# Patient Record
Sex: FEMALE | Race: BLACK OR AFRICAN AMERICAN | NOT HISPANIC OR LATINO | Employment: FULL TIME | URBAN - METROPOLITAN AREA
[De-identification: names, ages, dates, MRNs, and addresses within clinical notes are randomized per-mention and may not be internally consistent; named-entity substitution may affect disease eponyms.]

---

## 2017-04-11 DIAGNOSIS — I10 HYPERTENSION, BENIGN: ICD-10-CM

## 2017-04-11 RX ORDER — TRIAMTERENE AND HYDROCHLOROTHIAZIDE 37.5; 25 MG/1; MG/1
1 CAPSULE ORAL EVERY MORNING
Qty: 30 CAPSULE | Refills: 0 | Status: SHIPPED | OUTPATIENT
Start: 2017-04-11 | End: 2017-07-24 | Stop reason: SDUPTHER

## 2017-04-11 RX ORDER — AMLODIPINE BESYLATE 10 MG/1
10 TABLET ORAL DAILY
Qty: 30 TABLET | Refills: 0 | Status: SHIPPED | OUTPATIENT
Start: 2017-04-11 | End: 2017-07-24 | Stop reason: SDUPTHER

## 2017-07-24 DIAGNOSIS — I10 HYPERTENSION, BENIGN: ICD-10-CM

## 2017-07-24 RX ORDER — TRIAMTERENE AND HYDROCHLOROTHIAZIDE 37.5; 25 MG/1; MG/1
1 CAPSULE ORAL DAILY
Qty: 15 CAPSULE | Refills: 0 | Status: SHIPPED | OUTPATIENT
Start: 2017-07-24

## 2017-07-24 RX ORDER — AMLODIPINE BESYLATE 10 MG/1
10 TABLET ORAL DAILY
Qty: 15 TABLET | Refills: 0 | Status: SHIPPED | OUTPATIENT
Start: 2017-07-24

## 2017-10-13 ENCOUNTER — PATIENT OUTREACH (OUTPATIENT)
Dept: ADMINISTRATIVE | Facility: HOSPITAL | Age: 62
End: 2017-10-13

## 2017-10-13 NOTE — PROGRESS NOTES
Sent a letter per mail for patient to call back and schedule appointment for labs, office visit (schedule with new provider Dr. Prakash retired), and update health maintenance.

## 2017-10-25 DIAGNOSIS — Z12.11 ENCOUNTER FOR SCREENING FOR MALIGNANT NEOPLASM OF COLON: Primary | ICD-10-CM

## 2018-04-17 ENCOUNTER — HOSPITAL ENCOUNTER (EMERGENCY)
Facility: HOSPITAL | Age: 63
Discharge: HOME | End: 2018-04-17
Attending: EMERGENCY MEDICINE
Payer: COMMERCIAL

## 2018-04-17 VITALS
RESPIRATION RATE: 18 BRPM | TEMPERATURE: 98 F | WEIGHT: 180 LBS | SYSTOLIC BLOOD PRESSURE: 142 MMHG | OXYGEN SATURATION: 98 % | HEIGHT: 63 IN | DIASTOLIC BLOOD PRESSURE: 68 MMHG | BODY MASS INDEX: 31.89 KG/M2 | HEART RATE: 74 BPM

## 2018-04-17 DIAGNOSIS — S52.122A CLOSED DISPLACED FRACTURE OF HEAD OF LEFT RADIUS, INITIAL ENCOUNTER: Primary | ICD-10-CM
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
## 2018-04-17 PROCEDURE — 25000003 PHARM REV CODE 250: Performed by: PHYSICIAN ASSISTANT

## 2018-04-17 PROCEDURE — 99283 EMERGENCY DEPT VISIT LOW MDM: CPT | Mod: 25

## 2018-04-17 PROCEDURE — 29105 APPLICATION LONG ARM SPLINT: CPT | Mod: LT

## 2018-04-17 RX ORDER — HYDROCODONE BITARTRATE AND ACETAMINOPHEN 5; 325 MG/1; MG/1
1 TABLET ORAL
Status: COMPLETED | OUTPATIENT
Start: 2018-04-17 | End: 2018-04-17

## 2018-04-17 RX ORDER — HYDROCODONE BITARTRATE AND ACETAMINOPHEN 5; 325 MG/1; MG/1
1 TABLET ORAL EVERY 4 HOURS PRN
Qty: 10 TABLET | Refills: 0 | Status: SHIPPED | OUTPATIENT
Start: 2018-04-17

## 2018-04-17 RX ADMIN — HYDROCODONE BITARTRATE AND ACETAMINOPHEN 1 TABLET: 5; 325 TABLET ORAL at 01:04

## 2018-04-17 NOTE — ED PROVIDER NOTES
"Encounter Date: 2018    SCRIBE #1 NOTE: I, Bonita Doshi, am scribing for, and in the presence of,  Verónica Springer PA-C. I have scribed the following portions of the note - Other sections scribed: HPI and ROS.       History     Chief Complaint   Patient presents with    Arm Pain     Pt c/o left arm pain dut to trip and fall after chasing her dog 3 hrs ago. Pt denies hitting head or LOC. Took ibuprofen without relief     CC: Arm Pain    HPI: The pt is a 62 y.o. F who presents to the ED c/o pain that radiates from L shoulder to L wrist starting after pt experienced a fall last night without head trauma or LOC. Pt states that she was chasing her dog when she tripped and fell onto her L side. Pt rates pain as severe (10/10) and describes pain as feeling like "spasms." Pt reports taking ibuprofen for pain but that made her feel nauseous. Pt otherwise denies SOB, chest pain, abdominal pain, and other associated symptoms.    Pmhx includes HTN, osteoarthritis of knee, DJD, diverticulosis, and PUD.      The history is provided by the patient. No  was used.     Review of patient's allergies indicates:  No Known Allergies  Past Medical History:   Diagnosis Date    Diverticulosis, sigmoid     DJD (degenerative joint disease), lumbar     H pylori ulcer     History of constipation     Hypertension     Osteoarthritis of knee     PUD (peptic ulcer disease)     RLS (restless legs syndrome)      Past Surgical History:   Procedure Laterality Date     SECTION      CHOLECYSTECTOMY      TUBAL LIGATION      ulcer surgery       Family History   Problem Relation Age of Onset    Cancer Mother     Colon cancer Mother     Hypertension Sister     Diabetes Brother     Hypertension Brother     Breast cancer Neg Hx     Ovarian cancer Neg Hx      Social History   Substance Use Topics    Smoking status: Current Every Day Smoker     Packs/day: 0.50     Years: 40.00     Types: Cigarettes    Smokeless " tobacco: Never Used    Alcohol use No     Review of Systems   Constitutional: Negative for chills, diaphoresis and fever.   HENT: Negative for ear pain and sore throat.    Eyes: Negative for visual disturbance.   Respiratory: Negative for cough and shortness of breath.    Cardiovascular: Negative for chest pain.   Gastrointestinal: Negative for abdominal pain, diarrhea, nausea and vomiting.   Genitourinary: Negative for dysuria and vaginal bleeding.   Musculoskeletal: Positive for arthralgias (10/10, L shoulder, L wrist). Negative for back pain and neck pain.        (+) L arm pain (8/10)   Skin: Negative for color change, pallor, rash and wound.   Neurological: Negative for syncope and headaches.        (-) head trauma   Psychiatric/Behavioral: Negative for confusion.       Physical Exam     Initial Vitals [04/17/18 0022]   BP Pulse Resp Temp SpO2   (!) 153/77 81 16 98 °F (36.7 °C) 97 %      MAP       102.33         Physical Exam    Nursing note and vitals reviewed.  Constitutional: Vital signs are normal. She appears well-developed and well-nourished. She is not diaphoretic. She is cooperative.  Non-toxic appearance. She does not have a sickly appearance. She does not appear ill. No distress.   HENT:   Head: Normocephalic and atraumatic.   Right Ear: Tympanic membrane, external ear and ear canal normal.   Left Ear: Tympanic membrane, external ear and ear canal normal.   Nose: Nose normal.   Mouth/Throat: Uvula is midline, oropharynx is clear and moist and mucous membranes are normal. No oropharyngeal exudate.   Eyes: Conjunctivae, EOM and lids are normal. Pupils are equal, round, and reactive to light.   Neck: Trachea normal, normal range of motion, full passive range of motion without pain and phonation normal. Neck supple.   Cardiovascular: Normal rate, regular rhythm, normal heart sounds and intact distal pulses. Exam reveals no gallop and no friction rub.    No murmur heard.  Pulses:       Radial pulses are 2+  on the right side, and 2+ on the left side.   Capillary refill < 2 seconds of bilateral upper extremities.   Pulmonary/Chest: Effort normal and breath sounds normal. No respiratory distress. She has no decreased breath sounds. She has no wheezes. She has no rhonchi. She has no rales.   Abdominal: Soft. Normal appearance and bowel sounds are normal. She exhibits no distension and no mass. There is no tenderness. There is no rigidity, no rebound and no guarding.   Musculoskeletal: She exhibits tenderness. She exhibits no edema.        Right shoulder: Normal.        Left shoulder: Normal.        Right elbow: Normal.       Left elbow: She exhibits decreased range of motion. She exhibits no swelling, no effusion, no deformity and no laceration. Tenderness found. Radial head tenderness noted. No medial epicondyle, no lateral epicondyle and no olecranon process tenderness noted.        Right wrist: Normal.        Left wrist: Normal.        Cervical back: Normal.        Thoracic back: Normal.        Lumbar back: Normal.        Right upper arm: Normal.        Left upper arm: Normal.        Right forearm: Normal.        Left forearm: Normal.        Right hand: Normal.        Left hand: Normal.   Decreased ROM of left elbow secondary to pain.. There is point tenderness over the left elbow. There is no obvious deformity, effusion, swelling or laceration. Compartments soft. Sensation and strength intact. +2 radial pulses. Capillary refill intact.    Neurological: She is alert and oriented to person, place, and time. She has normal strength. No cranial nerve deficit or sensory deficit. She exhibits normal muscle tone. Coordination and gait normal. GCS eye subscore is 4. GCS verbal subscore is 5. GCS motor subscore is 6.   Skin: Skin is warm and dry. Capillary refill takes less than 2 seconds. No rash noted.   Psychiatric: She has a normal mood and affect. Her speech is normal and behavior is normal. Judgment and thought content  normal. Cognition and memory are normal.         ED Course   Procedures  Labs Reviewed - No data to display          Medical Decision Making:   Initial Assessment:   This is an evaluation of a 62 y.o. female with osteoarthritis and DJD that presents to the Emergency Department for arm pain. Patient reports left arm pain after a fall while chasing her dog this evening. She denies LOC or head injury. She reports severe sharp pain that is worse with movement of the arm. She reports attempted tx with Ibuprofen without relief. She denies any further symptoms.     ED Management:  Physical Exam shows a non-toxic, afebrile, and well appearing female. Normocephalic and atraumatic. Decreased ROM of left elbow secondary to pain.. There is point tenderness over the left elbow. There is no obvious deformity, effusion, swelling or laceration. Compartments soft. Sensation and strength intact. +2 radial pulses. Capillary refill intact. Patient ambulates without assistance. No further injuries.     Vital Signs Are Reassuring. If available, previous records reviewed.   RESULTS: Xray left shoulder negative for acute bony abnormality of the left shoulder or left humerus.   Xray left elbow shows minimally displaced radial head fracture with associated large elbow joint effusion    My overall impression is closed displaced left radial head fracture.  Ddx: fracture, dislocation, effusion, sprain, strain, compartment syndrome    ED Course: [place] for pain control. Sugar tong splint applied to left arm. Splint instructions given. I believe this patient is stable for discharge. D/C Meds: Norco (drowsiness precautions given) Additional D/C Information: I will recommend RICE therapy and close follow-up with orthopedics. The diagnosis, treatment plan, instructions for follow-up and reevaluation with orthopedics, as well as ED return precautions were discussed and understanding was verbalized. All questions or concerns have been addressed.  Patient was discharged home with an instructional sheet which gave not only information regarding the most likely diagnoses but also information regarding when to return to the emergency department for alarming symptoms and when to seek further care.          Other:   I have discussed this case with another health care provider.       <> Summary of the Discussion: This case was discussed with Dr. Maurice who is in agreement with my assessment and plan.   Verónica Springer PA-C              Scribe Attestation:   Scribe #1: I performed the above scribed service and the documentation accurately describes the services I performed. I attest to the accuracy of the note.    Attending Attestation:           Physician Attestation for Scribe:  Physician Attestation Statement for Scribe #1: I, Verónica Springer PA-C, reviewed documentation, as scribed by Bonita Doshi in my presence, and it is both accurate and complete.                    Clinical Impression:   The primary encounter diagnosis was Closed displaced fracture of head of left radius, initial encounter. A diagnosis of Fall was also pertinent to this visit.    Disposition:   Disposition: Discharged  Condition: Stable                        Verónica Springer PA-C  04/19/18 0841

## 2018-04-17 NOTE — ED TRIAGE NOTES
Patient arrived to ED with c/o left arm and shoulder pain x 3 hours secondary to tripping over her dog at home.  Denies neck or back pain.  No obvious deformity noted.  Pulses and cap refill good distal to injury.

## 2018-04-17 NOTE — DISCHARGE INSTRUCTIONS
Your xray shows a fracture of the radial head (one of the two forearm bones near your elbow). You will need to follow-up with orthopedics in clinic as soon as possible. Do not get your splint wet.    You have been prescribed Norco for pain. Please do not take this medication while working, drinking alcohol, swimming, or while driving/operating heavy machinery. This medication may cause drowsiness, impair judgment, and reduce physical capabilities. This medication contains Tylenol. Please do not take any additional Tylenol while you are taking this medication.     Return to the ER for any emergencies

## 2020-01-11 ENCOUNTER — HOSPITAL ENCOUNTER (EMERGENCY)
Facility: HOSPITAL | Age: 65
Discharge: HOME | End: 2020-01-12
Attending: EMERGENCY MEDICINE

## 2020-01-11 DIAGNOSIS — S16.1XXA STRAIN OF NECK MUSCLE, INITIAL ENCOUNTER: Primary | ICD-10-CM

## 2020-01-11 PROCEDURE — 99283 EMERGENCY DEPT VISIT LOW MDM: CPT

## 2020-01-12 VITALS
OXYGEN SATURATION: 97 % | DIASTOLIC BLOOD PRESSURE: 69 MMHG | WEIGHT: 185 LBS | RESPIRATION RATE: 18 BRPM | SYSTOLIC BLOOD PRESSURE: 143 MMHG | TEMPERATURE: 99 F | HEART RATE: 87 BPM | BODY MASS INDEX: 32.78 KG/M2 | HEIGHT: 63 IN

## 2020-01-12 PROCEDURE — 25000003 PHARM REV CODE 250: Performed by: PHYSICIAN ASSISTANT

## 2020-01-12 RX ORDER — DIAZEPAM 2 MG/1
2 TABLET ORAL EVERY 12 HOURS PRN
Qty: 12 TABLET | Refills: 0 | Status: SHIPPED | OUTPATIENT
Start: 2020-01-12 | End: 2020-02-11

## 2020-01-12 RX ORDER — DIAZEPAM 2 MG/1
2 TABLET ORAL
Status: COMPLETED | OUTPATIENT
Start: 2020-01-12 | End: 2020-01-12

## 2020-01-12 RX ADMIN — DIAZEPAM 2 MG: 2 TABLET ORAL at 12:01

## 2020-01-12 NOTE — DISCHARGE INSTRUCTIONS
Use medication as prescribed. Be aware, this medication is sedating.  Do not mix with alcohol or any other sedating medications.  Do not drive or operate machinery when taking this medication.     Please follow-up with primary care provider early next week for re-evaluation.  Return to this ED if symptoms worsen despite treatment, if you begin with any chest pain or trouble breathing, if you begin with any shooting pain down your arm, if you begin with any arm weakness, if you begin with fever, if any other problems occur.

## 2020-01-12 NOTE — ED PROVIDER NOTES
"Encounter Date: 2020       History     Chief Complaint   Patient presents with    Back Pain     pt reports pain from the posterior neck that radiates to upper center and upper left back ongoing since waking up 20; pt reports that it feels like a "crick" in her neck; pt denies any known injuries but reports that she "slept bad" Tuesday night; pt reports using icy hot, warm compress, & ice packs with no relief;    Neck Pain     65yo F with diverticulosis, DJD, hypertension, osteoarthritis, PUD, RLS, with chief complaint left-sided neck pain and stiffness x2 days.  No trauma. Patient says she has strained her neck like this in the past, however not as severe.  Denies any heavy lifting or any trauma. She states she may have slept wrong.  No radiculopathy or paresthesia.  No upper extremity weakness. No chest pain or shortness of breath. No abdominal pain. No fever.  No cough.  No recent illness or sick contacts.  No recent hospitalization.  Symptoms are acute, constant, severity 8/10.  There is very mild relief with gentle massage.  There is exacerbation with attempted range of motion of the neck, with with palpation of the cervical paraspinal and upper trapezius muscular.        Review of patient's allergies indicates:  No Known Allergies  Past Medical History:   Diagnosis Date    Diverticulosis, sigmoid     DJD (degenerative joint disease), lumbar     H pylori ulcer     History of constipation     Hypertension     Osteoarthritis of knee     PUD (peptic ulcer disease)     RLS (restless legs syndrome)      Past Surgical History:   Procedure Laterality Date     SECTION      CHOLECYSTECTOMY      TUBAL LIGATION      ulcer surgery       Family History   Problem Relation Age of Onset    Cancer Mother     Colon cancer Mother     Hypertension Sister     Diabetes Brother     Hypertension Brother     Breast cancer Neg Hx     Ovarian cancer Neg Hx      Social History     Tobacco " Use    Smoking status: Current Every Day Smoker     Packs/day: 0.50     Years: 40.00     Pack years: 20.00     Types: Cigarettes    Smokeless tobacco: Never Used   Substance Use Topics    Alcohol use: No    Drug use: No     Review of Systems   Constitutional: Negative for activity change, appetite change, chills and fever.   HENT: Negative for congestion, ear discharge, ear pain and rhinorrhea.    Respiratory: Negative for cough and chest tightness.    Cardiovascular: Negative for chest pain, palpitations and leg swelling.   Gastrointestinal: Negative for abdominal pain, nausea and vomiting.   Genitourinary: Negative for flank pain.   Musculoskeletal: Positive for neck pain and neck stiffness. Negative for arthralgias.   Skin: Negative for rash and wound.   Neurological: Negative for dizziness, weakness, light-headedness, numbness and headaches.       Physical Exam     Initial Vitals [01/11/20 2358]   BP Pulse Resp Temp SpO2   (!) 154/72 80 18 98.9 °F (37.2 °C) 98 %      MAP       --         Physical Exam    Nursing note and vitals reviewed.  Constitutional: She appears well-developed and well-nourished. She is not diaphoretic. No distress.   Uncomfortable appearing, but nontoxic.   HENT:   Head: Normocephalic and atraumatic.   Eyes: EOM are normal. Pupils are equal, round, and reactive to light.   Neck: Neck supple.   There is exquisite tenderness to the left cervical paraspinal musculature, to the left upper posterior trapezius musculature.  There is pain with attempted range of motion of the neck, limited range of motion secondary to discomfort.  There is no nuchal rigidity.  There is no midline spinal tenderness. There is no cervical lymphadenopathy.   Cardiovascular: Intact distal pulses.   1+ radial bilaterally   Pulmonary/Chest: No respiratory distress.   Lymphadenopathy:     She has no cervical adenopathy.   Neurological: She is alert and oriented to person, place, and time. She has normal strength. GCS  score is 15. GCS eye subscore is 4. GCS verbal subscore is 5. GCS motor subscore is 6.   Grossly symmetric cervical sensory and motor exam.   Skin: Skin is warm and dry. Capillary refill takes less than 2 seconds. No erythema.   Psychiatric: She has a normal mood and affect. Her behavior is normal. Judgment and thought content normal.         ED Course   Procedures  Labs Reviewed - No data to display       Imaging Results    None          Medical Decision Making:   Differential Diagnosis:   Fracture, contusion, sprain/strain, arthritis  ED Management:  On re-evaluation, pain has improved.    No radiculopathy or paresthesia with neck range of motion. No midline spinal tenderness. Good distal pulses. Normal upper extremity sensory and motor exam. No trauma. She does state that she has had a neck strain of this nature in the past.  There is no chest pain or trouble breathing.  Lungs are clear.  Heart is regular rate and rhythm.  Vitals are reassuring.  The acting safely treat this as a cervical/neck strain with Valium p.o. b.i.d. p.r.n..  Have given very strict return precautions.                                 Clinical Impression:       ICD-10-CM ICD-9-CM   1. Strain of neck muscle, initial encounter S16.1XXA 847.0         Disposition:   Disposition: Discharged  Condition: Stable                     Cachorro Mckeon PA-C  01/12/20 0649

## 2020-05-27 ENCOUNTER — LAB VISIT (OUTPATIENT)
Dept: PRIMARY CARE CLINIC | Facility: CLINIC | Age: 65
End: 2020-05-27
Payer: MEDICARE

## 2020-05-27 DIAGNOSIS — R05.9 COUGH: Primary | ICD-10-CM

## 2020-05-27 PROCEDURE — U0003 INFECTIOUS AGENT DETECTION BY NUCLEIC ACID (DNA OR RNA); SEVERE ACUTE RESPIRATORY SYNDROME CORONAVIRUS 2 (SARS-COV-2) (CORONAVIRUS DISEASE [COVID-19]), AMPLIFIED PROBE TECHNIQUE, MAKING USE OF HIGH THROUGHPUT TECHNOLOGIES AS DESCRIBED BY CMS-2020-01-R: HCPCS

## 2020-05-27 NOTE — PROGRESS NOTES
Nasal swab collected     DISPLAY PLAN FREE TEXT no back pain, no gout, no musculoskeletal pain, no neck pain, and no weakness.

## 2020-05-29 ENCOUNTER — TELEPHONE (OUTPATIENT)
Dept: EMERGENCY MEDICINE | Facility: HOSPITAL | Age: 65
End: 2020-05-29

## 2020-05-29 LAB — SARS-COV-2 RNA RESP QL NAA+PROBE: NOT DETECTED

## 2020-05-29 NOTE — TELEPHONE ENCOUNTER
Patient left voicemail, returned call. Results of negative COVID-19 testing relayed to patient.     Carrie Franklin PA-C

## 2021-04-15 ENCOUNTER — PATIENT MESSAGE (OUTPATIENT)
Dept: RESEARCH | Facility: HOSPITAL | Age: 66
End: 2021-04-15

## 2021-12-01 ENCOUNTER — HOSPITAL ENCOUNTER (EMERGENCY)
Facility: HOSPITAL | Age: 66
Discharge: HOME | End: 2021-12-01
Attending: EMERGENCY MEDICINE
Payer: MEDICARE

## 2021-12-01 VITALS
BODY MASS INDEX: 35.51 KG/M2 | WEIGHT: 208 LBS | OXYGEN SATURATION: 95 % | DIASTOLIC BLOOD PRESSURE: 76 MMHG | RESPIRATION RATE: 18 BRPM | TEMPERATURE: 100 F | SYSTOLIC BLOOD PRESSURE: 164 MMHG | HEIGHT: 64 IN | HEART RATE: 77 BPM

## 2021-12-01 DIAGNOSIS — J40 COMPLICATED BRONCHITIS: Primary | ICD-10-CM

## 2021-12-01 LAB
CTP QC/QA: YES
CTP QC/QA: YES
POC MOLECULAR INFLUENZA A AGN: NEGATIVE
POC MOLECULAR INFLUENZA B AGN: NEGATIVE
SARS-COV-2 RDRP RESP QL NAA+PROBE: NEGATIVE

## 2021-12-01 PROCEDURE — 99284 EMERGENCY DEPT VISIT MOD MDM: CPT | Mod: 25

## 2021-12-01 PROCEDURE — U0002 COVID-19 LAB TEST NON-CDC: HCPCS | Performed by: EMERGENCY MEDICINE

## 2021-12-01 PROCEDURE — 87502 INFLUENZA DNA AMP PROBE: CPT

## 2021-12-01 RX ORDER — DOXYCYCLINE 100 MG/1
100 CAPSULE ORAL 2 TIMES DAILY
Qty: 20 CAPSULE | Refills: 0 | Status: SHIPPED | OUTPATIENT
Start: 2021-12-01 | End: 2021-12-11

## 2021-12-01 RX ORDER — PROMETHAZINE HYDROCHLORIDE AND CODEINE PHOSPHATE 6.25; 1 MG/5ML; MG/5ML
5 SOLUTION ORAL EVERY 4 HOURS PRN
Qty: 120 ML | Refills: 0 | Status: SHIPPED | OUTPATIENT
Start: 2021-12-01 | End: 2021-12-11